# Patient Record
Sex: FEMALE | Race: BLACK OR AFRICAN AMERICAN
[De-identification: names, ages, dates, MRNs, and addresses within clinical notes are randomized per-mention and may not be internally consistent; named-entity substitution may affect disease eponyms.]

---

## 2017-09-21 ENCOUNTER — HOSPITAL ENCOUNTER (EMERGENCY)
Dept: HOSPITAL 61 - ER | Age: 22
Discharge: LEFT BEFORE DISCHARGE | End: 2017-09-21
Payer: SELF-PAY

## 2017-09-21 ENCOUNTER — HOSPITAL ENCOUNTER (EMERGENCY)
Dept: HOSPITAL 61 - ER | Age: 22
LOS: 1 days | Discharge: HOME | End: 2017-09-22
Payer: SELF-PAY

## 2017-09-21 VITALS — BODY MASS INDEX: 22.49 KG/M2 | HEIGHT: 65 IN | WEIGHT: 135 LBS

## 2017-09-21 VITALS — BODY MASS INDEX: 22.49 KG/M2 | WEIGHT: 135 LBS | HEIGHT: 65 IN

## 2017-09-21 VITALS — SYSTOLIC BLOOD PRESSURE: 121 MMHG | DIASTOLIC BLOOD PRESSURE: 60 MMHG

## 2017-09-21 DIAGNOSIS — R42: Primary | ICD-10-CM

## 2017-09-21 DIAGNOSIS — J45.909: ICD-10-CM

## 2017-09-21 DIAGNOSIS — K85.90: Primary | ICD-10-CM

## 2017-09-21 LAB
ALBUMIN SERPL-MCNC: 3.8 G/DL (ref 3.4–5)
ALBUMIN/GLOB SERPL: 1.3 {RATIO} (ref 1–1.7)
ALP SERPL-CCNC: 66 U/L (ref 46–116)
ALT SERPL-CCNC: 16 U/L (ref 14–59)
ANION GAP SERPL CALC-SCNC: 7 MMOL/L (ref 6–14)
ANION GAP SERPL CALC-SCNC: 8 MMOL/L (ref 6–14)
AST SERPL-CCNC: 17 U/L (ref 15–37)
BACTERIA #/AREA URNS HPF: 0 /HPF
BASOPHILS # BLD AUTO: 0 X10^3/UL (ref 0–0.2)
BASOPHILS # BLD AUTO: 0 X10^3/UL (ref 0–0.2)
BASOPHILS NFR BLD: 1 % (ref 0–3)
BASOPHILS NFR BLD: 1 % (ref 0–3)
BILIRUB SERPL-MCNC: 1.2 MG/DL (ref 0.2–1)
BILIRUB UR QL STRIP: NEGATIVE
BUN SERPL-MCNC: 12 MG/DL (ref 7–20)
BUN SERPL-MCNC: 9 MG/DL (ref 7–20)
BUN/CREAT SERPL: 11 (ref 6–20)
CALCIUM SERPL-MCNC: 9.1 MG/DL (ref 8.5–10.1)
CALCIUM SERPL-MCNC: 9.1 MG/DL (ref 8.5–10.1)
CHLORIDE SERPL-SCNC: 104 MMOL/L (ref 98–107)
CHLORIDE SERPL-SCNC: 105 MMOL/L (ref 98–107)
CO2 SERPL-SCNC: 27 MMOL/L (ref 21–32)
CO2 SERPL-SCNC: 29 MMOL/L (ref 21–32)
CREAT SERPL-MCNC: 0.8 MG/DL (ref 0.6–1)
CREAT SERPL-MCNC: 0.8 MG/DL (ref 0.6–1)
EOSINOPHIL NFR BLD: 0 % (ref 0–3)
EOSINOPHIL NFR BLD: 2 % (ref 0–3)
ERYTHROCYTE [DISTWIDTH] IN BLOOD BY AUTOMATED COUNT: 13.2 % (ref 11.5–14.5)
ERYTHROCYTE [DISTWIDTH] IN BLOOD BY AUTOMATED COUNT: 13.5 % (ref 11.5–14.5)
GFR SERPLBLD BASED ON 1.73 SQ M-ARVRAT: 111.8 ML/MIN
GFR SERPLBLD BASED ON 1.73 SQ M-ARVRAT: 111.8 ML/MIN
GLOBULIN SER-MCNC: 3 G/DL (ref 2.2–3.8)
GLUCOSE SERPL-MCNC: 76 MG/DL (ref 70–99)
GLUCOSE SERPL-MCNC: 97 MG/DL (ref 70–99)
GLUCOSE UR STRIP-MCNC: NEGATIVE MG/DL
HCT VFR BLD CALC: 38.2 % (ref 36–47)
HCT VFR BLD CALC: 40.6 % (ref 36–47)
HGB BLD-MCNC: 12.7 G/DL (ref 12–15.5)
HGB BLD-MCNC: 13.2 G/DL (ref 12–15.5)
LYMPHOCYTES # BLD: 1.6 X10^3/UL (ref 1–4.8)
LYMPHOCYTES # BLD: 1.9 X10^3/UL (ref 1–4.8)
LYMPHOCYTES NFR BLD AUTO: 22 % (ref 24–48)
LYMPHOCYTES NFR BLD AUTO: 30 % (ref 24–48)
MCH RBC QN AUTO: 28 PG (ref 25–35)
MCH RBC QN AUTO: 29 PG (ref 25–35)
MCHC RBC AUTO-ENTMCNC: 33 G/DL (ref 31–37)
MCHC RBC AUTO-ENTMCNC: 33 G/DL (ref 31–37)
MCV RBC AUTO: 86 FL (ref 79–100)
MCV RBC AUTO: 87 FL (ref 79–100)
MONOCYTES NFR BLD: 11 % (ref 0–9)
MONOCYTES NFR BLD: 7 % (ref 0–9)
NEUTROPHILS NFR BLD AUTO: 57 % (ref 31–73)
NEUTROPHILS NFR BLD AUTO: 70 % (ref 31–73)
NITRITE UR QL STRIP: NEGATIVE
PH UR STRIP: 6 [PH]
PLATELET # BLD AUTO: 258 X10^3/UL (ref 140–400)
PLATELET # BLD AUTO: 258 X10^3/UL (ref 140–400)
POTASSIUM SERPL-SCNC: 3.3 MMOL/L (ref 3.5–5.1)
POTASSIUM SERPL-SCNC: 4 MMOL/L (ref 3.5–5.1)
PROT SERPL-MCNC: 6.8 G/DL (ref 6.4–8.2)
PROT UR STRIP-MCNC: NEGATIVE MG/DL
RBC # BLD AUTO: 4.46 X10^6/UL (ref 3.5–5.4)
RBC # BLD AUTO: 4.69 X10^6/UL (ref 3.5–5.4)
RBC #/AREA URNS HPF: 0 /HPF (ref 0–2)
SODIUM SERPL-SCNC: 140 MMOL/L (ref 136–145)
SODIUM SERPL-SCNC: 140 MMOL/L (ref 136–145)
SP GR UR STRIP: 1.02
SQUAMOUS #/AREA URNS LPF: (no result) /LPF
UROBILINOGEN UR-MCNC: 0.2 MG/DL
WBC # BLD AUTO: 6.3 X10^3/UL (ref 4–11)
WBC # BLD AUTO: 7.5 X10^3/UL (ref 4–11)
WBC #/AREA URNS HPF: (no result) /HPF (ref 0–4)

## 2017-09-21 PROCEDURE — 81025 URINE PREGNANCY TEST: CPT

## 2017-09-21 PROCEDURE — 85025 COMPLETE CBC W/AUTO DIFF WBC: CPT

## 2017-09-21 PROCEDURE — 83690 ASSAY OF LIPASE: CPT

## 2017-09-21 PROCEDURE — 80053 COMPREHEN METABOLIC PANEL: CPT

## 2017-09-21 PROCEDURE — 99284 EMERGENCY DEPT VISIT MOD MDM: CPT

## 2017-09-21 PROCEDURE — 81001 URINALYSIS AUTO W/SCOPE: CPT

## 2017-09-21 PROCEDURE — 71020: CPT

## 2017-09-21 PROCEDURE — 80048 BASIC METABOLIC PNL TOTAL CA: CPT

## 2017-09-21 PROCEDURE — 36415 COLL VENOUS BLD VENIPUNCTURE: CPT

## 2017-09-21 PROCEDURE — 93005 ELECTROCARDIOGRAM TRACING: CPT

## 2017-09-21 NOTE — PHYS DOC
Past Medical History


Past Medical History:  Asthma


Past Surgical History:  No Surgical History


Alcohol Use:  None


Drug Use:  None





Adult General


Chief Complaint


Chief Complaint:  NEAR SYNCOPE





HPI


HPI





Patient is a 21  year old female who presents with generalized weakness and 

malaise that started this morning and she's had some vague abdominal discomfort 

with some nausea since yesterday. Denies any diarrhea or vaginal bleeding or 

discharge or dysuria or frequency or flank pain or fever.





Review of Systems


Review of Systems





Constitutional: Denies fever or chills []


Eyes: Denies change in visual acuity, redness, or eye pain []


HENT: Denies nasal congestion or sore throat []


Respiratory: Denies cough or shortness of breath []


Cardiovascular: No additional information not addressed in HPI []


GI: Denies abdominal pain, nausea, vomiting, bloody stools or diarrhea []


: Denies dysuria or hematuria []


Musculoskeletal: Denies back pain or joint pain []


Integument: Denies rash or skin lesions []


Neurologic: Denies headache, focal weakness or sensory changes []


Endocrine: Denies polyuria or polydipsia []





Current Medications


Current Medications





Current Medications








 Medications


  (Trade)  Dose


 Ordered  Sig/Leona  Start Time


 Stop Time Status Last Admin


Dose Admin


 


 Sodium Chloride  1,000 ml @ 


 1,000 mls/hr  1X  ONCE  9/21/17 12:00


 9/21/17 12:59 [place]  


 











Allergies


Allergies





Allergies








Coded Allergies Type Severity Reaction Last Updated Verified


 


  No Known Drug Allergies    9/21/17 No











Physical Exam


Physical Exam





Constitutional: Well developed, well nourished, no acute distress, non-toxic 

appearance. []


HENT: Normocephalic, atraumatic, bilateral external ears normal, oropharynx 

moist, no oral exudates, nose normal. []


Eyes: PERRLA, EOMI, conjunctiva normal, no discharge. [] 


Neck: Normal range of motion, no tenderness, supple, no stridor. [] 


Cardiovascular:Heart rate regular rhythm, no murmur []


Lungs & Thorax:  Bilateral breath sounds clear to auscultation []


Abdomen: Bowel sounds normal, soft, minimal poorly localized tenderness, no 

masses, no pulsatile masses. [] 


Skin: Warm, dry, no erythema, no rash. [] 


Back: No tenderness, no CVA tenderness. [] 


Extremities: No tenderness, no cyanosis, no clubbing, ROM intact, no edema. [] 


Neurologic: Alert and oriented X 3, normal motor function, normal sensory 

function, no focal deficits noted. []


Psychologic: Affect normal, judgement normal, mood normal. []





Current Patient Data


Vital Signs





 Vital Signs








  Date Time  Temp Pulse Resp B/P (MAP) Pulse Ox O2 Delivery O2 Flow Rate FiO2


 


9/21/17 10:55 98.4 82 16 121/60 (80) 99 Room Air  





 98.4       








Lab Values





 Laboratory Tests








Test


  9/21/17


11:00 9/21/17


11:09


 


White Blood Count


  6.3 x10^3/uL


(4.0-11.0) 


 


 


Red Blood Count


  4.46 x10^6/uL


(3.50-5.40) 


 


 


Hemoglobin


  12.7 g/dL


(12.0-15.5) 


 


 


Hematocrit


  38.2 %


(36.0-47.0) 


 


 


Mean Corpuscular Volume


  86 fL ()


  


 


 


Mean Corpuscular Hemoglobin 29 pg (25-35)   


 


Mean Corpuscular Hemoglobin


Concent 33 g/dL


(31-37) 


 


 


Red Cell Distribution Width


  13.2 %


(11.5-14.5) 


 


 


Platelet Count


  258 x10^3/uL


(140-400) 


 


 


Neutrophils (%) (Auto) 57 % (31-73)   


 


Lymphocytes (%) (Auto) 30 % (24-48)   


 


Monocytes (%) (Auto) 11 % (0-9)  H 


 


Eosinophils (%) (Auto) 2 % (0-3)   


 


Basophils (%) (Auto) 1 % (0-3)   


 


Neutrophils # (Auto)


  3.6 x10^3uL


(1.8-7.7) 


 


 


Lymphocytes # (Auto)


  1.9 x10^3/uL


(1.0-4.8) 


 


 


Monocytes # (Auto)


  0.7 x10^3/uL


(0.0-1.1) 


 


 


Eosinophils # (Auto)


  0.1 x10^3/uL


(0.0-0.7) 


 


 


Basophils # (Auto)


  0.0 x10^3/uL


(0.0-0.2) 


 


 


Urine Collection Type Unknown   


 


Urine Color Yellow   


 


Urine Clarity Clear   


 


Urine pH 6.0   


 


Urine Specific Gravity 1.025   


 


Urine Protein


  Negative mg/dL


(NEG-TRACE) 


 


 


Urine Glucose (UA)


  Negative mg/dL


(NEG) 


 


 


Urine Ketones (Stick)


  Negative mg/dL


(NEG) 


 


 


Urine Blood


  Negative (NEG)


  


 


 


Urine Nitrite


  Negative (NEG)


  


 


 


Urine Bilirubin


  Negative (NEG)


  


 


 


Urine Urobilinogen Dipstick


  0.2 mg/dL (0.2


mg/dL) 


 


 


Urine Leukocyte Esterase


  Negative (NEG)


  


 


 


Urine RBC 0 /HPF (0-2)   


 


Urine WBC


  1-4 /HPF (0-4)


  


 


 


Urine Squamous Epithelial


Cells Mod /LPF  


  


 


 


Urine Bacteria


  0 /HPF (0-FEW)


  


 


 


Urine Mucus Marked /LPF   


 


Sodium Level


  140 mmol/L


(136-145) 


 


 


Potassium Level


  4.0 mmol/L


(3.5-5.1) 


 


 


Chloride Level


  105 mmol/L


() 


 


 


Carbon Dioxide Level


  27 mmol/L


(21-32) 


 


 


Anion Gap 8 (6-14)   


 


Blood Urea Nitrogen


  9 mg/dL (7-20)


  


 


 


Creatinine


  0.8 mg/dL


(0.6-1.0) 


 


 


Estimated GFR


(Cockcroft-Gault) 111.8  


  


 


 


BUN/Creatinine Ratio 11 (6-20)   


 


Glucose Level


  76 mg/dL


(70-99) 


 


 


Calcium Level


  9.1 mg/dL


(8.5-10.1) 


 


 


Total Bilirubin


  1.2 mg/dL


(0.2-1.0)  H 


 


 


Aspartate Amino Transferase


(AST) 17 U/L (15-37)


  


 


 


Alanine Aminotransferase (ALT)


  16 U/L (14-59)


  


 


 


Alkaline Phosphatase


  66 U/L


() 


 


 


Total Protein


  6.8 g/dL


(6.4-8.2) 


 


 


Albumin


  3.8 g/dL


(3.4-5.0) 


 


 


Albumin/Globulin Ratio 1.3 (1.0-1.7)   


 


Lipase


  1019 U/L


()  H 


 


 


POC Urine HCG, Qualitative


  


  Hcg negative


(Negative)





 Laboratory Tests


9/21/17 11:00








 Laboratory Tests


9/21/17 11:00














EKG


EKG


[]





Radiology/Procedures


Radiology/Procedures


[]





Course & Med Decision Making


Course & Med Decision Making


Pertinent Labs and Imaging studies reviewed. (See chart for details)





[Plan to check basic labs urinalysis and IV hydration with serial reexam.





The nurse approached me to notify me the patient was leaving AGAINST MEDICAL 

ADVICE. Told the patient to wait while she came and talked to me. I explained 

to the nurse that the patient had what appears to be acute pancreatitis and 

probably need some further imaging to elucidate other issues such as gallstones 

or intra-abdominal issues that might be seen on CT scan or ultrasound. The 

nurse returned to the room to talk to the patient and she noted a left and did 

not wait even signing paperwork. She did track the patient down in the parking 

lot. An explained to the patient that she had a condition that needed admission 

to the hospital further evaluation and if it got worse could lead to death and 

disability. The patient appeared to understand this and had decision-making 

capacity.]





Dragon Disclaimer


Dragon Disclaimer


This electronic medical record was generated, in whole or in part, using a 

voice recognition dictation system.





Departure


Departure


Impression:  


 Primary Impression:  


 Acute pancreatitis


 Additional Impression:  


 Left against medical advice


Disposition:  07 AGAINST MEDICAL ADVICE


Referrals:  


NO PCP (PCP)





Problem Qualifiers











SADIE CACERES MD Sep 21, 2017 11:50

## 2017-09-22 VITALS — DIASTOLIC BLOOD PRESSURE: 68 MMHG | SYSTOLIC BLOOD PRESSURE: 116 MMHG

## 2017-09-22 NOTE — ED.ADGEN
Past Medical History


Past Medical History:  Asthma


Past Surgical History:  No Surgical History


Alcohol Use:  None


Drug Use:  None





Adult General


Chief Complaint


Chief Complaint:  FATIGUE





HPI


HPI





Patient is a 21  year old woman, history of asthma, who presents to the 

emergency department with complaint of "feeling tired and weak". Patient denies 

any fevers or chills, sick contacts or exposures, any chest pain or shortness 

of breath. States that she did have a brief episode of lower abdominal cramping 

that occurred earlier today, states none currently, denies any diarrhea or 

urinary complaints, any discharge or drainage of vagina, any concerns for STI 

exposures. She states she did feel lightheaded and slightly dizzy today, but 

did not have any syncope, denies any family history of sudden cardiac death, 

cardiac issues, PE or DVT, no swelling extremities, no sick contacts or 

exposures other complaints. Patient was seen in the emergency department 

earlier today with the same complaint, that time laboratory studies were drawn 

that revealed elevated lipase. Patient did sign AGAINST MEDICAL ADVICE because 

she stated she could not stay in the ED any longer, and states she is back now, 

because "now I have time to be here".





Review of Systems


Review of Systems


Constitutional:  Denies fever or chills. []


Eyes:  Denies change in visual acuity. []


HENT:  Denies nasal congestion or sore throat. [] 


Respiratory:  Denies cough or shortness of breath. [] 


Cardiovascular:  Denies chest pain or edema. [] 


GI:  Denies abdominal pain, nausea, vomiting, bloody stools or diarrhea. [] 


:  Denies dysuria. [] 


Musculoskeletal:  Denies back pain or joint pain. [] 


Integument:  Denies rash. [] 


Neurologic:  Denies headache, focal weakness or sensory changes. [] 


Endocrine:  Denies polyuria or polydipsia. [] 


Lymphatic:  Denies swollen glands. [] 


Psychiatric:  Denies depression or anxiety. []





Current Medications


Current Medications





Current Medications








 Medications


  (Trade)  Dose


 Ordered  Sig/Leona  Start Time


 Stop Time Status Last Admin


Dose Admin


 


 Potassium Chloride


  (KCl Oral Soln)  40 meq  1X  ONCE  17 00:30


 17 00:31 [place] 17 00:39


40 MEQ











Allergies


Allergies





Allergies








Coded Allergies Type Severity Reaction Last Updated Verified


 


  No Known Drug Allergies    17 No











Physical Exam


Physical Exam





Constitutional: Well developed, well nourished, no acute distress, non-toxic 

appearance. []


HENT: Normocephalic, atraumatic, bilateral external ears normal, oropharynx 

moist, no oral exudates, nose normal. []


Eyes: PERRLA, EOMI, conjunctiva normal, no discharge. [] 


Neck: Normal range of motion, no tenderness, supple, no stridor. [] 


Cardiovascular:Heart rate regular rhythm, no murmur, S1, S2, rubs or gallops. []


Lungs & Thorax:  Bilateral breath sounds clear to auscultation, no wheezing, 

rhonchi, rales. No chest wall crepitus or tenderness. []


Abdomen: Bowel sounds normal, soft, no tenderness, no rebound, rigidity, no 

guarding, no masses, no pulsatile masses. [] 


Skin: Warm, dry, no erythema, no rash. [] 


Back: No tenderness, no CVA tenderness. [] 


Extremities: No tenderness, no cyanosis, no clubbing, ROM intact, no edema. 

Negative Homans sign.[] 


Neurologic: Alert and oriented X 3, normal motor function, normal sensory 

function, no focal deficits noted. []


Psychologic: Affect normal, judgement normal, mood normal. []





Current Patient Data


Vital Signs





 Vital Signs








  Date Time  Temp Pulse Resp B/P (MAP) Pulse Ox O2 Delivery O2 Flow Rate FiO2


 


17 02:21  72 16 116/68 (84) 99 Room Air  


 


17 22:20 97.6       





 97.6       








Lab Values





 Laboratory Tests








Test


  17


22:59


 


White Blood Count


  7.5 x10^3/uL


(4.0-11.0)


 


Red Blood Count


  4.69 x10^6/uL


(3.50-5.40)


 


Hemoglobin


  13.2 g/dL


(12.0-15.5)


 


Hematocrit


  40.6 %


(36.0-47.0)


 


Mean Corpuscular Volume


  87 fL ()


 


 


Mean Corpuscular Hemoglobin 28 pg (25-35)  


 


Mean Corpuscular Hemoglobin


Concent 33 g/dL


(31-37)


 


Red Cell Distribution Width


  13.5 %


(11.5-14.5)


 


Platelet Count


  258 x10^3/uL


(140-400)


 


Neutrophils (%) (Auto) 70 % (31-73)  


 


Lymphocytes (%) (Auto) 22 % (24-48)  L


 


Monocytes (%) (Auto) 7 % (0-9)  


 


Eosinophils (%) (Auto) 0 % (0-3)  


 


Basophils (%) (Auto) 1 % (0-3)  


 


Neutrophils # (Auto)


  5.3 x10^3uL


(1.8-7.7)


 


Lymphocytes # (Auto)


  1.6 x10^3/uL


(1.0-4.8)


 


Monocytes # (Auto)


  0.6 x10^3/uL


(0.0-1.1)


 


Eosinophils # (Auto)


  0.0 x10^3/uL


(0.0-0.7)


 


Basophils # (Auto)


  0.0 x10^3/uL


(0.0-0.2)


 


Sodium Level


  140 mmol/L


(136-145)


 


Potassium Level


  3.3 mmol/L


(3.5-5.1)  L


 


Chloride Level


  104 mmol/L


()


 


Carbon Dioxide Level


  29 mmol/L


(21-32)


 


Anion Gap 7 (6-14)  


 


Blood Urea Nitrogen


  12 mg/dL


(7-20)


 


Creatinine


  0.8 mg/dL


(0.6-1.0)


 


Estimated GFR


(Cockcroft-Gault) 111.8  


 


 


Glucose Level


  97 mg/dL


(70-99)


 


Calcium Level


  9.1 mg/dL


(8.5-10.1)


 


Lipase


  182 U/L


()





 Laboratory Tests


17 22:59








 Laboratory Tests


17 22:59














EKG


EKG


EC: Sinus rhythm, heart rate 72 beats/minute, upright axis, QTC of 402, 

KS of 130, QRS of 92. Contour normality is noted in the anterior leads, but no 

ST elevations or depressions, no evidence of acute ST abnormalities. As 

interpreted by me.





Radiology/Procedures


Radiology/Procedures


Chest x-ray: One view: Normal cardiopulmonary silhouette, no infiltrates, no 

effusions, no pneumothorax, no soft tissue or bony abnormalities identified. As 

interpreted by me.





Course & Med Decision Making


Course & Med Decision Making


Pertinent Labs and Imaging studies reviewed. (See chart for details)





Patient with vital signs within normal limits, examination does not reveal any 

concerning findings, and patient with general complaints of fatigue, but denies 

any specific complaints consistent with abdominal pain, vomiting, diarrhea, 

upper or lower respiratory abnormalities. Initial lipase that was obtained 

earlier in the day was greater than 1000, on repeat is now 182, other 

electrolytes within normal limits, aside from a slightly low potassium at 3.3. 

Unclear if this may been a laboratory error. As stated, patient has no symptoms 

of pancreatitis, and no other concerning history or examination findings 

identified. Patient received oral potassium in the ED, received IV fluids, and 

reevaluation she states that she is feeling well, we did review laboratory 

studies, as concerning symptoms and identified, and patient is ambulatory to 

difficulty in the ED, with negative orthostatics, and no specific complaints, 

she is agreeable plan to be discharged home to continue good hydration, sleep 

habits, and increase potassium intake in diet. We did discuss concerning 

symptoms that prompt return, importance of establishing a primary care provider 

for additional evaluation general medical care. Patient discharged home in 

stable condition with plan as above.





Dragon Disclaimer


Dragon Disclaimer


This electronic medical record was generated, in whole or in part, using a 

voice recognition dictation system.





Departure


Impression:  


 Primary Impression:  


 Encounter for medical screening examination


Disposition:   HOME, SELF-CARE


Condition:  IMPROVED











MARTIN CORBETT DO Sep 22, 2017 02:55

## 2017-09-22 NOTE — EKG
[facility]

               [address] [place] [address]

Test Date:    2017               Test Time:    00:20:43

Pat Name:     KERMIT PRADO            Department:   

Patient ID:   [id number]           Room:          

Gender:       F                        Technician:   

:          [id number]               Requested By: MARTIN CORBETT

Order Number: [id number]            Reading MD:   Leola Ibarra

                                 Measurements

Intervals                              Axis          

Rate:         72                       P:            75

AK:           138                      QRS:          78

QRSD:         92                       T:            46

QT:           366                                    

QTc:          402                                    

                           Interpretive Statements

SINUS RHYTHM

LEFT ATRIAL ABNORMALITY





Electronically Signed On 2017 11:14:04 CDT by Leola Ibarra

## 2017-09-22 NOTE — RAD
Indication weakness. Protocol study.



Frontal and lateral views of the chest were obtained. No prior imaging is

available.



The heart, pulmonary vessels and mediastinum appear normal. The lungs are

clear. There is no pleural fluid. There is no pneumothorax. Mild scoliosis is

noted.



IMPRESSION: No acute finding seen in the chest

## 2018-04-25 ENCOUNTER — HOSPITAL ENCOUNTER (EMERGENCY)
Dept: HOSPITAL 61 - ER | Age: 23
Discharge: HOME | End: 2018-04-25
Payer: SELF-PAY

## 2018-04-25 DIAGNOSIS — N39.0: Primary | ICD-10-CM

## 2018-04-25 DIAGNOSIS — J45.909: ICD-10-CM

## 2018-04-25 LAB
BACTERIA #/AREA URNS HPF: (no result) /HPF
BILIRUBIN,URINE: (no result)
CLARITY,URINE: CLEAR
COLOR,URINE: (no result)
GLUCOSE,URINE: NEGATIVE MG/DL
NITRITE UR QL STRIP: POSITIVE
PH,URINE: 5.5
PROTEIN,URINE: 30 MG/DL
RBC,URINE: (no result) /HPF (ref 0–2)
SPECIFIC GRAVITY,URINE: >=1.03
SQUAMOUS EPITHELIAL CELL,UR: (no result) /LPF
URINE HCG POC: (no result)
UROBILINOGEN,URINE: 0.2 MG/DL
WBC #/AREA URNS HPF: >40 /HPF (ref 0–4)

## 2018-04-25 PROCEDURE — 81001 URINALYSIS AUTO W/SCOPE: CPT

## 2018-04-25 PROCEDURE — 81025 URINE PREGNANCY TEST: CPT

## 2018-04-25 PROCEDURE — 99284 EMERGENCY DEPT VISIT MOD MDM: CPT

## 2018-04-25 RX ADMIN — PHENAZOPYRIDINE HYDROCHLORIDE 1 MG: 200 TABLET ORAL at 22:13

## 2018-04-25 RX ADMIN — CEPHALEXIN 1 MG: 250 CAPSULE ORAL at 22:13
